# Patient Record
Sex: FEMALE | Race: OTHER | Employment: OTHER | URBAN - METROPOLITAN AREA
[De-identification: names, ages, dates, MRNs, and addresses within clinical notes are randomized per-mention and may not be internally consistent; named-entity substitution may affect disease eponyms.]

---

## 2022-01-28 ENCOUNTER — NEW PATIENT (OUTPATIENT)
Dept: URBAN - METROPOLITAN AREA CLINIC 39 | Facility: CLINIC | Age: 72
End: 2022-01-28

## 2022-01-28 DIAGNOSIS — H43.813: ICD-10-CM

## 2022-01-28 DIAGNOSIS — H04.123: ICD-10-CM

## 2022-01-28 DIAGNOSIS — H02.88B: ICD-10-CM

## 2022-01-28 DIAGNOSIS — H02.88A: ICD-10-CM

## 2022-01-28 DIAGNOSIS — H18.593: ICD-10-CM

## 2022-01-28 DIAGNOSIS — H25.813: ICD-10-CM

## 2022-01-28 PROCEDURE — 92004 COMPRE OPH EXAM NEW PT 1/>: CPT

## 2022-01-28 PROCEDURE — 92015 DETERMINE REFRACTIVE STATE: CPT

## 2022-01-28 PROCEDURE — 92134 CPTRZ OPH DX IMG PST SGM RTA: CPT

## 2022-01-28 RX ORDER — MINERAL OIL 2; 2 MG/.4ML; MG/.4ML: 1 EMULSION OPHTHALMIC

## 2022-01-28 ASSESSMENT — VISUAL ACUITY
OS_CC: 20/50-2
OD_SC: <J10
OS_SC: J10
OS_SC: 20/200
OD_CC: J6
OU_CC: J3
OU_SC: J8
OU_SC: 20/200
OD_BAT: 20/400
OU_CC: 20/40-1
OS_CC: J3
OD_SC: 20/200
OD_CC: 20/80+2
OS_BAT: 20/400

## 2022-01-28 ASSESSMENT — TONOMETRY
OS_IOP_MMHG: 12
OD_IOP_MMHG: 12

## 2022-03-04 ENCOUNTER — CONSULTATION/EVALUATION (OUTPATIENT)
Dept: URBAN - METROPOLITAN AREA CLINIC 39 | Facility: CLINIC | Age: 72
End: 2022-03-04

## 2022-03-04 DIAGNOSIS — H25.812: ICD-10-CM

## 2022-03-04 DIAGNOSIS — H25.811: ICD-10-CM

## 2022-03-04 PROCEDURE — 92136TC INTERFEROMETRY - TECHNICAL COMPONENT

## 2022-03-04 PROCEDURE — 99214 OFFICE O/P EST MOD 30 MIN: CPT

## 2022-03-04 PROCEDURE — 92286 ANT SGM IMG I&R SPECLR MIC: CPT

## 2022-03-04 RX ORDER — MOXIFLOXACIN HYDROCHLORIDE 5 MG/ML: 1 SOLUTION/ DROPS OPHTHALMIC

## 2022-03-04 RX ORDER — DUREZOL 0.5 MG/ML: 1 EMULSION OPHTHALMIC

## 2022-03-04 ASSESSMENT — VISUAL ACUITY
OS_BAT: > 20/400
OD_SC: > J12
OS_AM: 20/25-2
OD_SC: 20/200
OD_RAM: 20/25-2
OS_CC: J6
OS_SC: > J12
OD_CC: 20/70-1
OD_CC: J6
OD_BAT: > 20/400
OS_SC: 20/200
OS_CC: 20/70

## 2022-03-04 ASSESSMENT — TONOMETRY
OS_IOP_MMHG: 14
OD_IOP_MMHG: 12

## 2022-03-16 ENCOUNTER — PRE-OP/H&P (OUTPATIENT)
Dept: URBAN - METROPOLITAN AREA CLINIC 39 | Facility: CLINIC | Age: 72
End: 2022-03-16

## 2022-03-16 ENCOUNTER — SURGERY/PROCEDURE (OUTPATIENT)
Dept: URBAN - METROPOLITAN AREA CLINIC 39 | Facility: CLINIC | Age: 72
End: 2022-03-16

## 2022-03-16 DIAGNOSIS — H25.811: ICD-10-CM

## 2022-03-16 DIAGNOSIS — H25.812: ICD-10-CM

## 2022-03-16 PROCEDURE — 66984 XCAPSL CTRC RMVL W/O ECP: CPT

## 2022-03-16 PROCEDURE — 99211T TECH SERVICE

## 2022-03-17 ENCOUNTER — PREPPED CHART (OUTPATIENT)
Dept: URBAN - METROPOLITAN AREA CLINIC 39 | Facility: CLINIC | Age: 72
End: 2022-03-17

## 2022-03-17 DIAGNOSIS — H18.593: ICD-10-CM

## 2022-03-17 DIAGNOSIS — Z96.1: ICD-10-CM

## 2022-03-17 DIAGNOSIS — H04.123: ICD-10-CM

## 2022-03-17 DIAGNOSIS — H02.88B: ICD-10-CM

## 2022-03-17 DIAGNOSIS — H02.88A: ICD-10-CM

## 2022-03-17 DIAGNOSIS — H25.812: ICD-10-CM

## 2022-03-17 PROCEDURE — 99024 POSTOP FOLLOW-UP VISIT: CPT

## 2022-03-17 PROCEDURE — 92012 INTRM OPH EXAM EST PATIENT: CPT

## 2022-03-17 ASSESSMENT — VISUAL ACUITY
OS_SC: J12
OS_BAT: <20/400
OD_SC: 20/40
OD_SC: J12
OU_SC: 20/40
OU_SC: J10
OS_SC: 20/80-1

## 2022-03-17 ASSESSMENT — TONOMETRY
OD_IOP_MMHG: 24
OS_IOP_MMHG: 18

## 2022-03-17 NOTE — PATIENT DISCUSSION
Cataract surgery has been performed in the first eye and activities of daily living are still impaired. The patient would like to proceed with cataract surgery in the second eye as scheduled. The patient elects Basic OS, goal of Nelly.

## 2022-03-23 ENCOUNTER — SURGERY/PROCEDURE (OUTPATIENT)
Dept: URBAN - METROPOLITAN AREA CLINIC 39 | Facility: CLINIC | Age: 72
End: 2022-03-23

## 2022-03-23 ENCOUNTER — PRE-OP/H&P (OUTPATIENT)
Dept: URBAN - METROPOLITAN AREA CLINIC 39 | Facility: CLINIC | Age: 72
End: 2022-03-23

## 2022-03-23 DIAGNOSIS — Z96.1: ICD-10-CM

## 2022-03-23 DIAGNOSIS — H25.812: ICD-10-CM

## 2022-03-23 PROCEDURE — 99211T TECH SERVICE

## 2022-03-23 PROCEDURE — 66984 XCAPSL CTRC RMVL W/O ECP: CPT

## 2022-03-23 ASSESSMENT — VISUAL ACUITY
OS_SC: 20/70
OD_SC: J12
OD_SC: 20/40
OS_SC: J12

## 2022-03-23 ASSESSMENT — TONOMETRY: OD_IOP_MMHG: 18

## 2022-03-23 NOTE — PATIENT DISCUSSION
Cataract surgery has been performed in the first eye and activities of daily living are still impaired. The patient would like to proceed with cataract surgery in the second eye as scheduled. The patient elects Basic OS, goal of Emmetropia.

## 2022-03-24 ENCOUNTER — POST-OP (OUTPATIENT)
Dept: URBAN - METROPOLITAN AREA CLINIC 39 | Facility: CLINIC | Age: 72
End: 2022-03-24

## 2022-03-24 DIAGNOSIS — Z96.1: ICD-10-CM

## 2022-03-24 PROCEDURE — 99024 POSTOP FOLLOW-UP VISIT: CPT

## 2022-03-24 ASSESSMENT — VISUAL ACUITY
OU_SC: J8
OS_SC: >J10
OD_SC: 20/40
OS_SC: 20/70+2
OU_SC: 20/40
OD_SC: J10

## 2022-03-24 ASSESSMENT — TONOMETRY
OD_IOP_MMHG: 18
OS_IOP_MMHG: 35

## 2022-03-24 NOTE — PATIENT DISCUSSION
Retinal tear and detachment warning symptoms reviewed and patient instructed to call immediately if increasing floaters, flashes, or decreasing peripheral vision. details…

## 2022-04-01 ENCOUNTER — POST-OP (OUTPATIENT)
Dept: URBAN - METROPOLITAN AREA CLINIC 39 | Facility: CLINIC | Age: 72
End: 2022-04-01

## 2022-04-01 DIAGNOSIS — Z96.1: ICD-10-CM

## 2022-04-01 PROCEDURE — 99024 POSTOP FOLLOW-UP VISIT: CPT

## 2022-04-01 ASSESSMENT — TONOMETRY
OD_IOP_MMHG: 15
OS_IOP_MMHG: 12

## 2022-04-01 ASSESSMENT — VISUAL ACUITY
OS_SC: J10
OS_SC: 20/25-1
OD_SC: 20/40
OD_SC: J8

## 2022-04-29 ENCOUNTER — POST-OP (OUTPATIENT)
Dept: URBAN - METROPOLITAN AREA CLINIC 39 | Facility: CLINIC | Age: 72
End: 2022-04-29

## 2022-04-29 DIAGNOSIS — Z96.1: ICD-10-CM

## 2022-04-29 PROCEDURE — 99024 POSTOP FOLLOW-UP VISIT: CPT

## 2022-04-29 ASSESSMENT — VISUAL ACUITY
OD_SC: 20/40
OS_SC: 20/30-2
OU_SC: 20/30
OU_SC: J6
OD_SC: J10
OS_SC: J10

## 2022-04-29 ASSESSMENT — TONOMETRY
OS_IOP_MMHG: 13
OD_IOP_MMHG: 14

## 2022-09-09 ENCOUNTER — COMPREHENSIVE EXAM (OUTPATIENT)
Dept: URBAN - METROPOLITAN AREA CLINIC 39 | Facility: CLINIC | Age: 72
End: 2022-09-09

## 2022-09-09 DIAGNOSIS — H52.11: ICD-10-CM

## 2022-09-09 DIAGNOSIS — H52.4: ICD-10-CM

## 2022-09-09 DIAGNOSIS — H52.02: ICD-10-CM

## 2022-09-09 DIAGNOSIS — H52.203: ICD-10-CM

## 2022-09-09 PROCEDURE — 92015 DETERMINE REFRACTIVE STATE: CPT

## 2022-09-09 PROCEDURE — 92014 COMPRE OPH EXAM EST PT 1/>: CPT

## 2022-09-09 ASSESSMENT — VISUAL ACUITY
OS_CC: J1
OU_CC: J1+
OS_CC: 20/25-1
OD_CC: 20/25+2
OU_CC: 20/20-2
OD_CC: J2

## 2022-09-09 ASSESSMENT — TONOMETRY
OS_IOP_MMHG: 16
OD_IOP_MMHG: 15

## 2023-09-20 ENCOUNTER — COMPREHENSIVE EXAM (OUTPATIENT)
Dept: URBAN - METROPOLITAN AREA CLINIC 39 | Facility: CLINIC | Age: 73
End: 2023-09-20

## 2023-09-20 DIAGNOSIS — H18.593: ICD-10-CM

## 2023-09-20 DIAGNOSIS — H04.123: ICD-10-CM

## 2023-09-20 DIAGNOSIS — H52.203: ICD-10-CM

## 2023-09-20 DIAGNOSIS — H43.813: ICD-10-CM

## 2023-09-20 DIAGNOSIS — H52.11: ICD-10-CM

## 2023-09-20 DIAGNOSIS — H52.4: ICD-10-CM

## 2023-09-20 DIAGNOSIS — H26.493: ICD-10-CM

## 2023-09-20 DIAGNOSIS — H52.02: ICD-10-CM

## 2023-09-20 DIAGNOSIS — H02.88B: ICD-10-CM

## 2023-09-20 DIAGNOSIS — H02.88A: ICD-10-CM

## 2023-09-20 PROCEDURE — 92015 DETERMINE REFRACTIVE STATE: CPT

## 2023-09-20 PROCEDURE — 92014 COMPRE OPH EXAM EST PT 1/>: CPT

## 2023-09-20 ASSESSMENT — VISUAL ACUITY
OD_CC: J1+
OU_CC: 20/20
OS_CC: J1+
OU_CC: J1+
OS_CC: 20/25-1
OD_CC: 20/25

## 2023-09-20 ASSESSMENT — TONOMETRY
OD_IOP_MMHG: 15
OS_IOP_MMHG: 16

## 2024-09-24 ENCOUNTER — COMPREHENSIVE EXAM (OUTPATIENT)
Dept: URBAN - METROPOLITAN AREA CLINIC 39 | Facility: CLINIC | Age: 74
End: 2024-09-24

## 2024-09-24 DIAGNOSIS — H02.88B: ICD-10-CM

## 2024-09-24 DIAGNOSIS — H43.813: ICD-10-CM

## 2024-09-24 DIAGNOSIS — H04.123: ICD-10-CM

## 2024-09-24 DIAGNOSIS — H26.493: ICD-10-CM

## 2024-09-24 DIAGNOSIS — H52.11: ICD-10-CM

## 2024-09-24 DIAGNOSIS — H18.593: ICD-10-CM

## 2024-09-24 DIAGNOSIS — H02.88A: ICD-10-CM

## 2024-09-24 DIAGNOSIS — Z96.1: ICD-10-CM

## 2024-09-24 PROCEDURE — 92015 DETERMINE REFRACTIVE STATE: CPT

## 2024-09-24 PROCEDURE — 92014 COMPRE OPH EXAM EST PT 1/>: CPT

## 2024-10-17 ENCOUNTER — CONSULTATION/EVALUATION (OUTPATIENT)
Dept: URBAN - METROPOLITAN AREA CLINIC 39 | Facility: CLINIC | Age: 74
End: 2024-10-17

## 2024-10-17 ENCOUNTER — SURGERY/PROCEDURE (OUTPATIENT)
Facility: LOCATION | Age: 74
End: 2024-10-17

## 2024-10-17 DIAGNOSIS — H26.493: ICD-10-CM

## 2024-10-17 PROCEDURE — 99214 OFFICE O/P EST MOD 30 MIN: CPT | Mod: 57

## 2024-10-17 PROCEDURE — 6682150 YAG CAPSULOTOMY: Mod: 50

## 2024-11-13 ENCOUNTER — POST-OP (OUTPATIENT)
Dept: URBAN - METROPOLITAN AREA CLINIC 39 | Facility: CLINIC | Age: 74
End: 2024-11-13

## 2024-11-13 DIAGNOSIS — Z98.890: ICD-10-CM

## 2024-11-13 PROCEDURE — 99024 POSTOP FOLLOW-UP VISIT: CPT
